# Patient Record
Sex: MALE
[De-identification: names, ages, dates, MRNs, and addresses within clinical notes are randomized per-mention and may not be internally consistent; named-entity substitution may affect disease eponyms.]

---

## 2018-01-12 ENCOUNTER — HOSPITAL ENCOUNTER (OUTPATIENT)
Dept: HOSPITAL 31 - C.SDS | Age: 68
Discharge: HOME | End: 2018-01-12
Attending: OTOLARYNGOLOGY
Payer: MEDICARE

## 2018-01-12 VITALS — TEMPERATURE: 97.5 F | RESPIRATION RATE: 16 BRPM

## 2018-01-12 VITALS — HEART RATE: 64 BPM | DIASTOLIC BLOOD PRESSURE: 65 MMHG | OXYGEN SATURATION: 95 % | SYSTOLIC BLOOD PRESSURE: 102 MMHG

## 2018-01-12 VITALS — BODY MASS INDEX: 29 KG/M2

## 2018-01-12 DIAGNOSIS — Z79.82: ICD-10-CM

## 2018-01-12 DIAGNOSIS — J38.7: Primary | ICD-10-CM

## 2018-01-12 DIAGNOSIS — K29.70: ICD-10-CM

## 2018-01-12 DIAGNOSIS — Z86.718: ICD-10-CM

## 2018-01-12 DIAGNOSIS — Z79.899: ICD-10-CM

## 2018-01-12 DIAGNOSIS — K21.9: ICD-10-CM

## 2018-01-12 DIAGNOSIS — R49.0: ICD-10-CM

## 2018-01-12 DIAGNOSIS — R13.14: ICD-10-CM

## 2018-01-12 DIAGNOSIS — Z98.890: ICD-10-CM

## 2018-01-12 DIAGNOSIS — I10: ICD-10-CM

## 2018-01-12 DIAGNOSIS — E78.00: ICD-10-CM

## 2018-01-12 PROCEDURE — 31525 DX LARYNGOSCOPY EXCL NB: CPT

## 2018-01-12 NOTE — OP
PROCEDURE DATE:  01/12/2018



PREOPERATIVE DIAGNOSES:  Dysphagia, hoarseness.



POSTOPERATIVE DIAGNOSES:  Dysphagia, hoarseness.



PROCEDURE:  Direct laryngoscopy.



FINDINGS:  There was noted erythema.



DESCRIPTION OF PROCEDURE:  The patient was brought into the room, placed in

a supine position.  Anesthesia was initiated through an ET tube.  Shoulder

roll was placed, neck extended.  The patient was draped in usual manner. 

Tooth guard was placed over the upper teeth in order to protect them and

remained there for the entire case.  Direct laryngoscope was inserted into

the oral cavity, passed to the oropharynx and hypopharynx.  The pharyngeal

walls, base of tongue, vallecula, epiglottis, AE folds, false cords, true

cords, pyriform sinuses, arytenoids were brought into view.  Region of

erythema was noted.  No masses or lesions were noted.  The direct

laryngoscope was removed.  The tooth guard was removed.  The patient was

taken off the anesthesia and taken to recovery room in stable manner.



__________________________________________

Babak Behin, MD





DD:  01/12/2018 11:21:52

DT:  01/12/2018 14:43:42

Job # [id number]